# Patient Record
Sex: MALE | Race: WHITE | Employment: FULL TIME | URBAN - METROPOLITAN AREA
[De-identification: names, ages, dates, MRNs, and addresses within clinical notes are randomized per-mention and may not be internally consistent; named-entity substitution may affect disease eponyms.]

---

## 2020-05-04 PROCEDURE — U0004 COV-19 TEST NON-CDC HGH THRU: HCPCS | Performed by: INTERNAL MEDICINE

## 2020-05-04 PROCEDURE — U0002 COVID-19 LAB TEST NON-CDC: HCPCS | Performed by: INTERNAL MEDICINE

## 2022-11-22 ENCOUNTER — HOSPITAL ENCOUNTER (INPATIENT)
Age: 62
LOS: 4 days | Discharge: HOME | DRG: 439 | End: 2022-11-26
Attending: INTERNAL MEDICINE | Admitting: INTERNAL MEDICINE
Payer: COMMERCIAL

## 2022-11-22 PROBLEM — K80.50 CHOLEDOCHOLITHIASIS: Status: ACTIVE | Noted: 2022-11-22

## 2022-11-22 PROBLEM — I10 ESSENTIAL HYPERTENSION: Status: ACTIVE | Noted: 2022-11-22

## 2022-11-22 PROBLEM — R10.13 EPIGASTRIC ABDOMINAL PAIN: Status: ACTIVE | Noted: 2022-11-22

## 2022-11-22 PROCEDURE — 6360000002 HC RX W HCPCS: Performed by: INTERNAL MEDICINE

## 2022-11-22 PROCEDURE — 2580000003 HC RX 258: Performed by: INTERNAL MEDICINE

## 2022-11-22 PROCEDURE — 1200000000 HC SEMI PRIVATE

## 2022-11-22 RX ORDER — ONDANSETRON 2 MG/ML
4 INJECTION INTRAMUSCULAR; INTRAVENOUS EVERY 6 HOURS PRN
Status: DISCONTINUED | OUTPATIENT
Start: 2022-11-22 | End: 2022-11-26 | Stop reason: HOSPADM

## 2022-11-22 RX ORDER — ACETAMINOPHEN 650 MG/1
650 SUPPOSITORY RECTAL EVERY 6 HOURS PRN
Status: DISCONTINUED | OUTPATIENT
Start: 2022-11-22 | End: 2022-11-26 | Stop reason: HOSPADM

## 2022-11-22 RX ORDER — POLYETHYLENE GLYCOL 3350 17 G/17G
17 POWDER, FOR SOLUTION ORAL DAILY PRN
Status: DISCONTINUED | OUTPATIENT
Start: 2022-11-22 | End: 2022-11-26 | Stop reason: HOSPADM

## 2022-11-22 RX ORDER — SODIUM CHLORIDE 0.9 % (FLUSH) 0.9 %
5-40 SYRINGE (ML) INJECTION PRN
Status: DISCONTINUED | OUTPATIENT
Start: 2022-11-22 | End: 2022-11-26 | Stop reason: HOSPADM

## 2022-11-22 RX ORDER — ACETAMINOPHEN 325 MG/1
650 TABLET ORAL EVERY 6 HOURS PRN
Status: DISCONTINUED | OUTPATIENT
Start: 2022-11-22 | End: 2022-11-26 | Stop reason: HOSPADM

## 2022-11-22 RX ORDER — SODIUM CHLORIDE 9 MG/ML
INJECTION, SOLUTION INTRAVENOUS PRN
Status: DISCONTINUED | OUTPATIENT
Start: 2022-11-22 | End: 2022-11-26 | Stop reason: HOSPADM

## 2022-11-22 RX ORDER — ENOXAPARIN SODIUM 100 MG/ML
40 INJECTION SUBCUTANEOUS EVERY 24 HOURS
Status: DISCONTINUED | OUTPATIENT
Start: 2022-11-22 | End: 2022-11-26 | Stop reason: HOSPADM

## 2022-11-22 RX ORDER — OXYCODONE HYDROCHLORIDE AND ACETAMINOPHEN 5; 325 MG/1; MG/1
1 TABLET ORAL EVERY 4 HOURS PRN
Status: DISCONTINUED | OUTPATIENT
Start: 2022-11-22 | End: 2022-11-26 | Stop reason: HOSPADM

## 2022-11-22 RX ORDER — HYDRALAZINE HYDROCHLORIDE 20 MG/ML
10 INJECTION INTRAMUSCULAR; INTRAVENOUS EVERY 4 HOURS PRN
Status: DISCONTINUED | OUTPATIENT
Start: 2022-11-22 | End: 2022-11-26 | Stop reason: HOSPADM

## 2022-11-22 RX ORDER — SODIUM CHLORIDE 0.9 % (FLUSH) 0.9 %
5-40 SYRINGE (ML) INJECTION EVERY 12 HOURS SCHEDULED
Status: DISCONTINUED | OUTPATIENT
Start: 2022-11-22 | End: 2022-11-26 | Stop reason: HOSPADM

## 2022-11-22 RX ORDER — MORPHINE SULFATE 2 MG/ML
2 INJECTION, SOLUTION INTRAMUSCULAR; INTRAVENOUS EVERY 4 HOURS PRN
Status: DISCONTINUED | OUTPATIENT
Start: 2022-11-22 | End: 2022-11-22

## 2022-11-22 RX ORDER — SODIUM CHLORIDE 9 MG/ML
INJECTION, SOLUTION INTRAVENOUS CONTINUOUS
Status: ACTIVE | OUTPATIENT
Start: 2022-11-22 | End: 2022-11-23

## 2022-11-22 RX ORDER — ONDANSETRON 4 MG/1
4 TABLET, ORALLY DISINTEGRATING ORAL EVERY 8 HOURS PRN
Status: DISCONTINUED | OUTPATIENT
Start: 2022-11-22 | End: 2022-11-26 | Stop reason: HOSPADM

## 2022-11-22 RX ORDER — OXYCODONE HYDROCHLORIDE AND ACETAMINOPHEN 5; 325 MG/1; MG/1
2 TABLET ORAL EVERY 4 HOURS PRN
Status: DISCONTINUED | OUTPATIENT
Start: 2022-11-22 | End: 2022-11-26 | Stop reason: HOSPADM

## 2022-11-22 RX ORDER — MORPHINE SULFATE 4 MG/ML
4 INJECTION, SOLUTION INTRAMUSCULAR; INTRAVENOUS EVERY 4 HOURS PRN
Status: DISCONTINUED | OUTPATIENT
Start: 2022-11-22 | End: 2022-11-22

## 2022-11-22 RX ADMIN — SODIUM CHLORIDE, PRESERVATIVE FREE 10 ML: 5 INJECTION INTRAVENOUS at 20:39

## 2022-11-22 RX ADMIN — SODIUM CHLORIDE: 9 INJECTION, SOLUTION INTRAVENOUS at 20:36

## 2022-11-22 RX ADMIN — HYDROMORPHONE HYDROCHLORIDE 1 MG: 1 INJECTION, SOLUTION INTRAMUSCULAR; INTRAVENOUS; SUBCUTANEOUS at 20:36

## 2022-11-22 RX ADMIN — MORPHINE SULFATE 4 MG: 4 INJECTION, SOLUTION INTRAMUSCULAR; INTRAVENOUS at 18:28

## 2022-11-22 SDOH — HEALTH STABILITY: PHYSICAL HEALTH: ON AVERAGE, HOW MANY MINUTES DO YOU ENGAGE IN EXERCISE AT THIS LEVEL?: 50 MIN

## 2022-11-22 SDOH — ECONOMIC STABILITY: INCOME INSECURITY: IN THE LAST 12 MONTHS, WAS THERE A TIME WHEN YOU WERE NOT ABLE TO PAY THE MORTGAGE OR RENT ON TIME?: NO

## 2022-11-22 SDOH — ECONOMIC STABILITY: HOUSING INSECURITY
IN THE LAST 12 MONTHS, WAS THERE A TIME WHEN YOU DID NOT HAVE A STEADY PLACE TO SLEEP OR SLEPT IN A SHELTER (INCLUDING NOW)?: NO

## 2022-11-22 SDOH — ECONOMIC STABILITY: HOUSING INSECURITY: IN THE LAST 12 MONTHS, HOW MANY PLACES HAVE YOU LIVED?: 1

## 2022-11-22 SDOH — HEALTH STABILITY: PHYSICAL HEALTH: ON AVERAGE, HOW MANY DAYS PER WEEK DO YOU ENGAGE IN MODERATE TO STRENUOUS EXERCISE (LIKE A BRISK WALK)?: 5 DAYS

## 2022-11-22 ASSESSMENT — PAIN DESCRIPTION - DESCRIPTORS
DESCRIPTORS: ACHING;DISCOMFORT;STABBING;SPASM
DESCRIPTORS: CRUSHING;DISCOMFORT;GNAWING

## 2022-11-22 ASSESSMENT — PATIENT HEALTH QUESTIONNAIRE - PHQ9
1. LITTLE INTEREST OR PLEASURE IN DOING THINGS: NOT AT ALL
2. FEELING DOWN, DEPRESSED OR HOPELESS: NOT AT ALL
SUM OF ALL RESPONSES TO PHQ9 QUESTIONS 1 & 2: 0

## 2022-11-22 ASSESSMENT — PAIN SCALES - GENERAL
PAINLEVEL_OUTOF10: 4
PAINLEVEL_OUTOF10: 8
PAINLEVEL_OUTOF10: 4
PAINLEVEL_OUTOF10: 8
PAINLEVEL_OUTOF10: 7
PAINLEVEL_OUTOF10: 7

## 2022-11-22 ASSESSMENT — SOCIAL DETERMINANTS OF HEALTH (SDOH)
HOW HARD IS IT FOR YOU TO PAY FOR THE VERY BASICS LIKE FOOD, HOUSING, MEDICAL CARE, AND HEATING?: NOT HARD AT ALL
WITHIN THE LAST YEAR, HAVE YOU BEEN AFRAID OF YOUR PARTNER OR EX-PARTNER?: NO
WITHIN THE LAST YEAR, HAVE TO BEEN RAPED OR FORCED TO HAVE ANY KIND OF SEXUAL ACTIVITY BY YOUR PARTNER OR EX-PARTNER?: NO
WITHIN THE LAST YEAR, HAVE YOU BEEN HUMILIATED OR EMOTIONALLY ABUSED IN OTHER WAYS BY YOUR PARTNER OR EX-PARTNER?: NO
WITHIN THE LAST YEAR, HAVE YOU BEEN KICKED, HIT, SLAPPED, OR OTHERWISE PHYSICALLY HURT BY YOUR PARTNER OR EX-PARTNER?: NO

## 2022-11-22 ASSESSMENT — PAIN - FUNCTIONAL ASSESSMENT
PAIN_FUNCTIONAL_ASSESSMENT: PREVENTS OR INTERFERES SOME ACTIVE ACTIVITIES AND ADLS
PAIN_FUNCTIONAL_ASSESSMENT: PREVENTS OR INTERFERES SOME ACTIVE ACTIVITIES AND ADLS

## 2022-11-22 ASSESSMENT — PAIN DESCRIPTION - ORIENTATION
ORIENTATION: MID
ORIENTATION: MID

## 2022-11-22 ASSESSMENT — PAIN DESCRIPTION - FREQUENCY: FREQUENCY: CONTINUOUS

## 2022-11-22 ASSESSMENT — PAIN DESCRIPTION - LOCATION
LOCATION: ABDOMEN
LOCATION: ABDOMEN

## 2022-11-22 ASSESSMENT — PAIN SCALES - WONG BAKER: WONGBAKER_NUMERICALRESPONSE: 6

## 2022-11-23 ENCOUNTER — APPOINTMENT (OUTPATIENT)
Dept: MRI IMAGING | Age: 62
DRG: 439 | End: 2022-11-23
Attending: INTERNAL MEDICINE
Payer: COMMERCIAL

## 2022-11-23 LAB
ALBUMIN SERPL-MCNC: 3.6 G/DL (ref 3.4–5)
ALP BLD-CCNC: 130 U/L (ref 40–129)
ALT SERPL-CCNC: 218 U/L (ref 10–40)
ANION GAP SERPL CALCULATED.3IONS-SCNC: 10 MMOL/L (ref 3–16)
ANION GAP SERPL CALCULATED.3IONS-SCNC: 9 MMOL/L (ref 3–16)
AST SERPL-CCNC: 59 U/L (ref 15–37)
BILIRUB SERPL-MCNC: 0.6 MG/DL (ref 0–1)
BILIRUBIN DIRECT: <0.2 MG/DL (ref 0–0.3)
BILIRUBIN, INDIRECT: ABNORMAL MG/DL (ref 0–1)
BUN BLDV-MCNC: 22 MG/DL (ref 7–20)
BUN BLDV-MCNC: 22 MG/DL (ref 7–20)
CALCIUM SERPL-MCNC: 8.1 MG/DL (ref 8.3–10.6)
CALCIUM SERPL-MCNC: 8.2 MG/DL (ref 8.3–10.6)
CHLORIDE BLD-SCNC: 103 MMOL/L (ref 99–110)
CHLORIDE BLD-SCNC: 103 MMOL/L (ref 99–110)
CHOLESTEROL, TOTAL: 164 MG/DL (ref 0–199)
CO2: 27 MMOL/L (ref 21–32)
CO2: 28 MMOL/L (ref 21–32)
CREAT SERPL-MCNC: 0.9 MG/DL (ref 0.8–1.3)
CREAT SERPL-MCNC: 0.9 MG/DL (ref 0.8–1.3)
GFR SERPL CREATININE-BSD FRML MDRD: >60 ML/MIN/{1.73_M2}
GFR SERPL CREATININE-BSD FRML MDRD: >60 ML/MIN/{1.73_M2}
GLUCOSE BLD-MCNC: 112 MG/DL (ref 70–99)
GLUCOSE BLD-MCNC: 115 MG/DL (ref 70–99)
HCT VFR BLD CALC: 34.8 % (ref 40.5–52.5)
HDLC SERPL-MCNC: 29 MG/DL (ref 40–60)
HEMOGLOBIN: 11.8 G/DL (ref 13.5–17.5)
LDL CHOLESTEROL CALCULATED: 103 MG/DL
LIPASE: 631 U/L (ref 13–60)
MAGNESIUM: 1.8 MG/DL (ref 1.8–2.4)
MCH RBC QN AUTO: 32 PG (ref 26–34)
MCHC RBC AUTO-ENTMCNC: 33.8 G/DL (ref 31–36)
MCV RBC AUTO: 94.6 FL (ref 80–100)
PDW BLD-RTO: 12.9 % (ref 12.4–15.4)
PHOSPHORUS: 2.2 MG/DL (ref 2.5–4.9)
PLATELET # BLD: 264 K/UL (ref 135–450)
PMV BLD AUTO: 7.5 FL (ref 5–10.5)
POTASSIUM REFLEX MAGNESIUM: 3.7 MMOL/L (ref 3.5–5.1)
POTASSIUM SERPL-SCNC: 3.7 MMOL/L (ref 3.5–5.1)
RBC # BLD: 3.68 M/UL (ref 4.2–5.9)
SODIUM BLD-SCNC: 140 MMOL/L (ref 136–145)
SODIUM BLD-SCNC: 140 MMOL/L (ref 136–145)
TOTAL PROTEIN: 6.1 G/DL (ref 6.4–8.2)
TRIGL SERPL-MCNC: 158 MG/DL (ref 0–150)
VLDLC SERPL CALC-MCNC: 32 MG/DL
WBC # BLD: 14.4 K/UL (ref 4–11)

## 2022-11-23 PROCEDURE — 80076 HEPATIC FUNCTION PANEL: CPT

## 2022-11-23 PROCEDURE — 6360000002 HC RX W HCPCS: Performed by: INTERNAL MEDICINE

## 2022-11-23 PROCEDURE — 83690 ASSAY OF LIPASE: CPT

## 2022-11-23 PROCEDURE — 6370000000 HC RX 637 (ALT 250 FOR IP): Performed by: INTERNAL MEDICINE

## 2022-11-23 PROCEDURE — 36415 COLL VENOUS BLD VENIPUNCTURE: CPT

## 2022-11-23 PROCEDURE — 80069 RENAL FUNCTION PANEL: CPT

## 2022-11-23 PROCEDURE — 83735 ASSAY OF MAGNESIUM: CPT

## 2022-11-23 PROCEDURE — 74181 MRI ABDOMEN W/O CONTRAST: CPT

## 2022-11-23 PROCEDURE — 94761 N-INVAS EAR/PLS OXIMETRY MLT: CPT

## 2022-11-23 PROCEDURE — 2700000000 HC OXYGEN THERAPY PER DAY

## 2022-11-23 PROCEDURE — 80061 LIPID PANEL: CPT

## 2022-11-23 PROCEDURE — 1200000000 HC SEMI PRIVATE

## 2022-11-23 PROCEDURE — 2580000003 HC RX 258: Performed by: INTERNAL MEDICINE

## 2022-11-23 PROCEDURE — 85027 COMPLETE CBC AUTOMATED: CPT

## 2022-11-23 RX ORDER — SODIUM CHLORIDE 9 MG/ML
INJECTION, SOLUTION INTRAVENOUS CONTINUOUS
Status: DISCONTINUED | OUTPATIENT
Start: 2022-11-23 | End: 2022-11-24

## 2022-11-23 RX ORDER — OLMESARTAN MEDOXOMIL 40 MG/1
40 TABLET ORAL DAILY
COMMUNITY

## 2022-11-23 RX ORDER — SODIUM CHLORIDE 9 MG/ML
INJECTION, SOLUTION INTRAVENOUS CONTINUOUS
Status: DISCONTINUED | OUTPATIENT
Start: 2022-11-23 | End: 2022-11-23

## 2022-11-23 RX ORDER — NEBIVOLOL 10 MG/1
TABLET ORAL DAILY
COMMUNITY

## 2022-11-23 RX ADMIN — ENOXAPARIN SODIUM 40 MG: 100 INJECTION SUBCUTANEOUS at 20:35

## 2022-11-23 RX ADMIN — SODIUM CHLORIDE, PRESERVATIVE FREE 10 ML: 5 INJECTION INTRAVENOUS at 20:37

## 2022-11-23 RX ADMIN — OXYCODONE AND ACETAMINOPHEN 1 TABLET: 5; 325 TABLET ORAL at 14:48

## 2022-11-23 RX ADMIN — SODIUM CHLORIDE: 9 INJECTION, SOLUTION INTRAVENOUS at 04:50

## 2022-11-23 RX ADMIN — SODIUM CHLORIDE, PRESERVATIVE FREE 10 ML: 5 INJECTION INTRAVENOUS at 08:34

## 2022-11-23 RX ADMIN — HYDROMORPHONE HYDROCHLORIDE 1 MG: 1 INJECTION, SOLUTION INTRAMUSCULAR; INTRAVENOUS; SUBCUTANEOUS at 08:35

## 2022-11-23 RX ADMIN — OXYCODONE AND ACETAMINOPHEN 1 TABLET: 5; 325 TABLET ORAL at 02:34

## 2022-11-23 RX ADMIN — HYDROMORPHONE HYDROCHLORIDE 1 MG: 1 INJECTION, SOLUTION INTRAMUSCULAR; INTRAVENOUS; SUBCUTANEOUS at 20:36

## 2022-11-23 RX ADMIN — OXYCODONE AND ACETAMINOPHEN 1 TABLET: 5; 325 TABLET ORAL at 10:01

## 2022-11-23 RX ADMIN — HYDROMORPHONE HYDROCHLORIDE 1 MG: 1 INJECTION, SOLUTION INTRAMUSCULAR; INTRAVENOUS; SUBCUTANEOUS at 01:14

## 2022-11-23 RX ADMIN — HYDROMORPHONE HYDROCHLORIDE 1 MG: 1 INJECTION, SOLUTION INTRAMUSCULAR; INTRAVENOUS; SUBCUTANEOUS at 04:51

## 2022-11-23 RX ADMIN — SODIUM CHLORIDE: 9 INJECTION, SOLUTION INTRAVENOUS at 19:00

## 2022-11-23 RX ADMIN — OXYCODONE AND ACETAMINOPHEN 1 TABLET: 5; 325 TABLET ORAL at 06:17

## 2022-11-23 RX ADMIN — HYDROMORPHONE HYDROCHLORIDE 1 MG: 1 INJECTION, SOLUTION INTRAMUSCULAR; INTRAVENOUS; SUBCUTANEOUS at 15:49

## 2022-11-23 RX ADMIN — OXYCODONE AND ACETAMINOPHEN 2 TABLET: 5; 325 TABLET ORAL at 18:59

## 2022-11-23 ASSESSMENT — PAIN SCALES - GENERAL
PAINLEVEL_OUTOF10: 8
PAINLEVEL_OUTOF10: 0
PAINLEVEL_OUTOF10: 8
PAINLEVEL_OUTOF10: 7
PAINLEVEL_OUTOF10: 5
PAINLEVEL_OUTOF10: 6
PAINLEVEL_OUTOF10: 9
PAINLEVEL_OUTOF10: 7
PAINLEVEL_OUTOF10: 6
PAINLEVEL_OUTOF10: 8

## 2022-11-23 ASSESSMENT — PAIN DESCRIPTION - DESCRIPTORS
DESCRIPTORS: ACHING;DISCOMFORT;DULL
DESCRIPTORS: DISCOMFORT
DESCRIPTORS: BURNING;DISCOMFORT;CRAMPING
DESCRIPTORS: ACHING

## 2022-11-23 ASSESSMENT — PAIN SCALES - WONG BAKER
WONGBAKER_NUMERICALRESPONSE: 2
WONGBAKER_NUMERICALRESPONSE: 0

## 2022-11-23 ASSESSMENT — PAIN - FUNCTIONAL ASSESSMENT
PAIN_FUNCTIONAL_ASSESSMENT: PREVENTS OR INTERFERES SOME ACTIVE ACTIVITIES AND ADLS
PAIN_FUNCTIONAL_ASSESSMENT: ACTIVITIES ARE NOT PREVENTED
PAIN_FUNCTIONAL_ASSESSMENT: PREVENTS OR INTERFERES SOME ACTIVE ACTIVITIES AND ADLS

## 2022-11-23 ASSESSMENT — PAIN DESCRIPTION - ORIENTATION
ORIENTATION: MID
ORIENTATION: RIGHT;LEFT
ORIENTATION: RIGHT;LEFT;MID

## 2022-11-23 ASSESSMENT — PAIN DESCRIPTION - FREQUENCY: FREQUENCY: CONTINUOUS

## 2022-11-23 ASSESSMENT — PAIN DESCRIPTION - LOCATION
LOCATION: ABDOMEN

## 2022-11-23 ASSESSMENT — PAIN DESCRIPTION - PAIN TYPE
TYPE: ACUTE PAIN

## 2022-11-23 NOTE — CARE COORDINATION
CASE MANAGEMENT INITIAL ASSESSMENT    Reviewed chart and completed assessment with patient family at bedside. Pt asleep during interview  Family present:  yes, spouse and 2 other family member    Explained Case Management role/services. Primary contact information: spouse    Health Care Decision Maker :   Primary Decision Maker: Jaelyn Daley - Spouse - [phone]    Supplemental (Other) Decision Maker: Jane Lozano - Other Relative - [phone]        Admit date/status: IPA 11/22/22  Diagnosis:choledocholithiasis     Is this a Readmission?:  No      Insurance:none listed, but spouse states pt has coverage     Precert required for SNF: No       3 night stay required: No    Living arrangements, Adls, care needs, prior to admission: IPTA, resides w/spouse. Active     Durable Medical Equipment at home:  none    Services in the home and/or outpatient, prior to admission: none    Current PCP:         Medications: Prescription coverage? Yes Will pt require financial assistance with medications No     Transportation needs: spouse states family can provide     PT/OT recs: [facility] Exemption Notification (HEN): na    Barriers to discharge: none    Plan/comments: spouse states pt will likely dc home without needs. Please consult CM team if needs arise.      Haleigh Pickard RN

## 2022-11-23 NOTE — CONSULTS
Killian George is a 58 y.o. male asked to see us in consultation by  No primary care provider on file. Marquis Thurston MD for evaluation of pancreatitis, concern for CBD stones. Ms. Harmony Miller is a 60-year-old male with past medical history of HTN who presented to the ER with acute onset of diffuse abdominal pain radiating to his right shoulder that began yesterday morning. States he returned from a mission trip in [place] 3 weeks ago and developed traveler's diarrhea shortly after his return. He completed a week course of ciprofloxacin with resolution of symptoms. When he woke at 3 am yesterday with pain, he had a syncopal episode. Wife says he passed out. He presented to [facility]  ER and was found to have an elevated bilirubin 1.7, , , alk phos 170 ad lipase 97924. White count was elevated at 16,000. CT findings were consistent with pancreatitis with intrahepatic ductal dilation. He was transferred to [facility] for further care. Repeat LFTs this morning are improved, total bilirubin down to 0.6 and alk phos 130. He went for an MRCP that showed no evidence of choledocholithiasis or biliary dilation. Gallstones are noted within the gallbladder. He drinks alcohol on special occasions and holidays. TCGs in process. Aside from Cipro no other medications or supplements. Currently on 4 L O2 as he became hypoxic. This has improved with pain control. Medications Prior to Admission: olmesartan (BENICAR) 40 MG tablet, Take 40 mg by mouth daily  nebivolol (BYSTOLIC) 10 MG tablet, Take by mouth daily    Medication:   sodium chloride flush  5-40 mL IntraVENous 2 times per day    enoxaparin  40 mg SubCUTAneous Q24H      sodium chloride      sodium chloride 125 mL/hr at 11/23/22 8180       Allergies:  No Known Allergies    Immunizations:     There is no immunization history on file for this patient. Family history, past medical history, and  social  history  are  reviewed as  below. Past Medical  History:  Past Medical History:   Diagnosis Date    Hypertension        Past  Surgical History:  Past Surgical History:   Procedure Laterality Date    SHOULDER SURGERY Left        Family  History:  History reviewed. No pertinent family history. Social History:  Social History     Tobacco Use    Smoking status: Never    Smokeless tobacco: Never   Substance Use Topics    Alcohol use: Not Currently    Drug use: Not Currently       ROS  Constitutional:  Denies fever,sweats, chills or weight loss  Eyes:  Denies change in visual acuity   HENT:  Denies hearing loss or dizziness  Respiratory:  Denies cough or shortness of breath   Cardiovascular:  Denies edema or chest pain  :  Denies dysuria, hematuria, urgency or frequency  Musculoskeletal:  +  back pain or joint pain   Integument:  Denies rash   Neurologic:  Denies headache, previous stroke, TIA, confusion   Endocrine:  Denies polyuria or polydipsia   Lymphatic:  Denies swollen glands   Psychiatric:  Denies depression or anxiety   Hematologic: Denies previous anemia or easy bruising    All other review of systems negative, except for those noted. PHYSICAL EXAM:   VITAL SIGNS: /76   Pulse 94   Temp 99.7 °F (37.6 °C) (Oral)   Resp 18   Ht 5' 8\" (1.727 m)   Wt 182 lb (82.6 kg)   SpO2 90%   BMI 27.67 kg/m²   Date 11/23/22 0000 - 11/23/22 2359   Shift 4281-8196 5430-4417 4996-6771 24 Hour Total   INTAKE   P.O. 0   0   I. V.(mL/kg/hr) 1004(1.5)   1004   Shift Total(mL/kg) 0364(85.8)   4676(28.2)   OUTPUT   Shift Total(mL/kg)       Weight (kg) 82.6 82.6 82.6 82.6       Constitutional: Well developed. Well nourished. Non-toxic appearance, appears in pain. HENT: Normocephalic. Atraumatic. Bilateral external ears normal, Oropharynx moist.  No oral exudate.   Nose normal.   Eyes: No  Scleral icterus   Cardiovascular: RRR  Thorax & Lungs: Non labored at rest, no respiratory distress, 4 LO2  Abdomen: soft, + mid abdominal, epigastric and RUQ tenderness. No guarding, rebound tenderness. No hepatomegaly. No splenomegaly. No ascites  Rectal:  Deferred. Skin: Warm, dry. No erythema. No rash. Extremities: Intact distal pulses, No deformity. No edema. Neurologic:  No Asterixis    RESULTS    No results found for: ALT, AST, GGT, ALKPHOS, BILIDIR, PROT, LABALBU, INR, AMYLASE, LIPASE  No results found for: WBC, HGB, HCT, MCV, PLT  No results found for: CREATININE, BUN, NA, K, CL, CO2    IMAGES  No results found. Assessment:  1. Acute pancreatitis, suspect biliary cause. Appears to have passed stones as his labs are improved and MRCP without evidence of choledocholithiasis this morning. No frequent alcohol use. TCGs have also been ordered. 2. Elevated LFTs secondary to biliary obstruction. Unlikely DILI from Cipro. Plan:  1. NPO.  2. Continue IVF at 150. Received aggressive hydration in [place] - boluses and fluids at 250 for 24 hours. 3. Antiemetics and analgesics as needed. 4. Follow LFTs. 5. Will need eventual surgery consult once pancreatitis improved. 1.  The patient indicates understanding of these issues and agrees with the plan. 2.  I reviewed the patient's medical information and medical history. 3.  I have reviewed the past medical, family, and social history sections including the medications and allergies listed in the above medical record. Thank you for permitting us to participate in the care of your patient. Please do not hesitate to call with questions or concerns.        Electronically signed by: NELLA Graham 11/23/2022 8:04 AM

## 2022-11-23 NOTE — PROGRESS NOTES
Hospitalist Progress Note      PCP: No primary care provider on file. Date of Admission: 11/22/2022    Chief Complaint: epigastric abdominal pain, passing out    Hospital Course: reviewed     Subjective: resting in bed, no complaints, family at bedside, pain controlled       Medications:  Reviewed    Infusion Medications    sodium chloride      sodium chloride 125 mL/hr at 11/23/22 0450     Scheduled Medications    sodium chloride flush  5-40 mL IntraVENous 2 times per day    enoxaparin  40 mg SubCUTAneous Q24H     PRN Meds: sodium chloride flush, sodium chloride, ondansetron **OR** ondansetron, polyethylene glycol, acetaminophen **OR** acetaminophen, oxyCODONE-acetaminophen **OR** oxyCODONE-acetaminophen, hydrALAZINE, HYDROmorphone      Intake/Output Summary (Last 24 hours) at 11/23/2022 0924  Last data filed at 11/23/2022 0555  Gross per 24 hour   Intake 1004 ml   Output --   Net 1004 ml       Physical Exam Performed:    /78   Pulse 81   Temp 99.7 °F (37.6 °C) (Oral)   Resp 16   Ht 5' 8\" (1.727 m)   Wt 182 lb (82.6 kg)   SpO2 93%   BMI 27.67 kg/m²     General appearance: No apparent distress, appears stated age and cooperative. HEENT: Pupils equal, round, and reactive to light. Conjunctivae/corneas clear. Neck: Supple, with full range of motion. No jugular venous distention. Trachea midline. Respiratory:  Normal respiratory effort. Clear to auscultation, bilaterally without Rales/Wheezes/Rhonchi. Cardiovascular: Regular rate and rhythm with normal S1/S2 without murmurs, rubs or gallops. Abdomen: Soft, non-tender, non-distended with normal bowel sounds. Musculoskeletal: No clubbing, cyanosis or edema bilaterally. Full range of motion without deformity. Skin: Skin color, texture, turgor normal.  No rashes or lesions. Neurologic:  Neurovascularly intact without any focal sensory/motor deficits.  Cranial nerves: II-XII intact, grossly non-focal.  Psychiatric: Alert and oriented, thought content appropriate, normal insight  Capillary Refill: Brisk, 3 seconds, normal   Peripheral Pulses: +2 palpable, equal bilaterally       Labs:   No results for input(s): WBC, HGB, HCT, PLT in the last 72 hours. No results for input(s): NA, K, CL, CO2, BUN, CREATININE, CALCIUM, PHOS in the last 72 hours. Invalid input(s): MAGNES  No results for input(s): AST, ALT, BILIDIR, BILITOT, ALKPHOS in the last 72 hours. No results for input(s): INR in the last 72 hours. No results for input(s): Othelia Nutley in the last 72 hours.     Urinalysis:    No results found for: Larna Jesica, BACTERIA, RBCUA, BLOODU, [address], [address]    Radiology:  MRI ABDOMEN WO CONTRAST MRCP    (Results Pending)       IP CONSULT TO GI    Assessment/Plan:    Active Hospital Problems    Diagnosis     Choledocholithiasis [K80.50]      Priority: Medium    Essential hypertension [I10]      Priority: Medium    Epigastric abdominal pain [R10.13]      Priority: Medium       Choledocholithiasis, gallstone pancreatitis - Kept NPO.  - Provided aggressive IV fluids  -consulted GI.   -MRCP ordered and results pending  - Check Lipids     Epigastric abdominal pain - Provided pain medications as needed     Essential (primary) hypertension - provided prn meds and monitor blood pressure           Code Status: Full Code  Diet: Diet NPO Exceptions are: Sips of Water with Meds  Code Status: Full Code  PT/OT Eval Status: not needed    Dispo - pending GI recs, clinical improvement, ?by weekend    Appropriate for A1 Discharge Unit: No      Roberto Frausto MD

## 2022-11-23 NOTE — H&P
Hospital Medicine  History and Physical    PCP: No primary care provider on file. Patient Name: Rashid Peterson    Date of Service: Pt seen/examined on 11/22/22 and admitted to Inpatient with expected LOS greater than two midnights due to medical therapy. CHIEF COMPLAINT:  Pt c/o epigastric abdominal pain, passing out  HISTORY OF PRESENT ILLNESS: Pt is an 58y.o. year-old male with a history of hypertension. He recently returned from a [address] work in [place], [place] (3-weeks ago). When he returned he had a \"stomach bug.\" He was treated wit Cipro and improved. He developed mild to moderate pain in his epigastric area last night. He proceeded to go to bed. He awoke at approximately 3 am and went to the bathroom. He then experienced severe epigastric abdominal pain and passed out. His wife heard him fall and found him on the bathroom floor I severe pain. He describes the pain as severe, gripping and spasming pain. In the ER his work up had findings concerning for Gallstone Pancreatitis with a Total Bili of 1.7, , , Alk Phos 170, WBC pf 16.0 and a Lipase of 44557. A CT of the abdomen and pelvis had finding c/w acute pancreatitis, borderline dilated common bile duct without significant intrahepatic ductal dilatation. No defined ductal stone, pancreatic mass or ampullary mass. Hepatic steatosis. He is being admitted for further evaluation and treatment. Associated signs and symptoms do not include fever or chills, diarrhea, melena, hematochezia, hematemesis, sweats, dark urine or jaundice. Past Medical History:    History reviewed. No pertinent past medical history. Past Surgical History:    No past surgical history on file. Allergies:  Patient has no known allergies. Medications Prior to Admission:    Prior to Admission medications    Not on File       Family History:   Family history is negative for accelerated coronary artery disease, diabetes or malignancies.     Social History:   TOBACCO:   reports that he has never smoked. He has never used smokeless tobacco.  ETOH:   reports that he does not currently use alcohol. OCCUPATION:      REVIEW OF SYSTEMS:  A full review of systems was performed and is negative except for that which appears in the HPI    Physical Exam:    Vitals: Pulse 64   Temp 98 °F (36.7 °C) (Oral)   Resp 20   Ht 5' 8\" (1.727 m)   Wt 182 lb (82.6 kg)   SpO2 92%   BMI 27.67 kg/m²   General appearance: WD/WN 58y.o. year-old male who is alert, appears stated age and is cooperative  HEENT: Head: Normocephalic, no lesions, without obvious abnormality. Eye: Normal external eye, conjunctiva, lids cornea, PEERL. Ears: Normal external ears. Non-tender. Nose: Normal external nose, mucus membranes and septum. Pharynx: Dental Hygiene adequate. Normal buccal mucosa. Normal pharynx. Neck: no adenopathy, no carotid bruit, no JVD, supple, symmetrical, trachea midline and thyroid not enlarged, symmetric, no tenderness/mass/nodules  Lungs: clear to auscultation bilaterally and no use of accessory muscles  Heart: regular rate and rhythm, S1, S2 normal, no murmur, click, rub or gallop and normal apical impulse  Abdomen: Generalized TTP wit guarding , no rebound; bowel sounds normal; no masses, no organomegaly  Extremities: extremities atraumatic, no cyanosis or edema and Homans sign is negative, no sign of DVT. Capillary Refill: Acceptable < 3 seconds   Peripheral Pulses: +3 easily felt, not easily obliterated with pressures   Skin: Skin color, texture, turgor normal. No rashes or lesions on exposed skin  Neurologic: Neurovascularly intact without any focal sensory/motor deficits. Cranial nerves: II-XII intact, grossly non-focal. Gait was not tested. Mental Status: Alert and oriented, thought content appropriate, normal insight        CBC: No results for input(s): WBC, HGB, PLT in the last 72 hours.   BMP:  No results for input(s): NA, K, CL, CO2, BUN, CREATININE, GLUCOSE in the last 72 hours. Troponin: No results for input(s): TROPONINI in the last 72 hours. PT/INR:  No results found for: PTINR  U/A:  No results found for: LEUKOCYTESUR, NITRITE, RBCUA, SPECGRAV, [address], BILIRUBINUR, BLOODU, GLUCOSEU, [address]      RAD:   I have independently reviewed and interpreted the imaging studies below and based my recommendations to the patient on those findings. No results found. Assessment:   Principal Problem:    Choledocholithiasis  Active Problems:    Essential hypertension    Epigastric abdominal pain  Resolved Problems:    * No resolved hospital problems. *      Plan:       Choledocholithiasis, gallstone pancreatitis - Keep NPO. Provide IV fluids and consult GI. MRCP ordered. Check Lipids    Epigastric abdominal pain - Provide pain medications as needed    Essential (primary) hypertension - provide prn meds and monitor blood pressure         Code Status: Full Code  Diet: Diet NPO Exceptions are: Sips of Water with Meds  DVT Prophylaxis: Lovenox    (Advanced care planning has been discussed with patient and/or responsible family member and is reflected in the code status.  Further orders associated with this have been entered if appropriate)    Disposition: Anticipate that patient will remain in the hospital for 2 or more midnights depending on further evaluation and clinical course     Please note that over 50 minutes was spent in evaluating the patient, review of records and results, discussion with staff/family, etc.      Yan Carlisle MD

## 2022-11-24 LAB
ALBUMIN SERPL-MCNC: 3.2 G/DL (ref 3.4–5)
ALP BLD-CCNC: 114 U/L (ref 40–129)
ALT SERPL-CCNC: 117 U/L (ref 10–40)
ANION GAP SERPL CALCULATED.3IONS-SCNC: 10 MMOL/L (ref 3–16)
AST SERPL-CCNC: 29 U/L (ref 15–37)
BILIRUB SERPL-MCNC: 0.8 MG/DL (ref 0–1)
BILIRUBIN DIRECT: 0.3 MG/DL (ref 0–0.3)
BILIRUBIN, INDIRECT: 0.5 MG/DL (ref 0–1)
BUN BLDV-MCNC: 20 MG/DL (ref 7–20)
CALCIUM SERPL-MCNC: 8.2 MG/DL (ref 8.3–10.6)
CHLORIDE BLD-SCNC: 102 MMOL/L (ref 99–110)
CO2: 25 MMOL/L (ref 21–32)
CREAT SERPL-MCNC: 0.9 MG/DL (ref 0.8–1.3)
GFR SERPL CREATININE-BSD FRML MDRD: >60 ML/MIN/{1.73_M2}
GLUCOSE BLD-MCNC: 76 MG/DL (ref 70–99)
HCT VFR BLD CALC: 30.8 % (ref 40.5–52.5)
HEMOGLOBIN: 10.6 G/DL (ref 13.5–17.5)
LIPASE: 176 U/L (ref 13–60)
MCH RBC QN AUTO: 32.4 PG (ref 26–34)
MCHC RBC AUTO-ENTMCNC: 34.3 G/DL (ref 31–36)
MCV RBC AUTO: 94.5 FL (ref 80–100)
PDW BLD-RTO: 12.8 % (ref 12.4–15.4)
PLATELET # BLD: 208 K/UL (ref 135–450)
PMV BLD AUTO: 7.6 FL (ref 5–10.5)
POTASSIUM SERPL-SCNC: 3.5 MMOL/L (ref 3.5–5.1)
RBC # BLD: 3.26 M/UL (ref 4.2–5.9)
SODIUM BLD-SCNC: 137 MMOL/L (ref 136–145)
TOTAL PROTEIN: 6 G/DL (ref 6.4–8.2)
WBC # BLD: 16.2 K/UL (ref 4–11)

## 2022-11-24 PROCEDURE — 6370000000 HC RX 637 (ALT 250 FOR IP): Performed by: INTERNAL MEDICINE

## 2022-11-24 PROCEDURE — 6360000002 HC RX W HCPCS: Performed by: INTERNAL MEDICINE

## 2022-11-24 PROCEDURE — 2700000000 HC OXYGEN THERAPY PER DAY

## 2022-11-24 PROCEDURE — 83690 ASSAY OF LIPASE: CPT

## 2022-11-24 PROCEDURE — 94761 N-INVAS EAR/PLS OXIMETRY MLT: CPT

## 2022-11-24 PROCEDURE — 85027 COMPLETE CBC AUTOMATED: CPT

## 2022-11-24 PROCEDURE — 2580000003 HC RX 258: Performed by: INTERNAL MEDICINE

## 2022-11-24 PROCEDURE — 80048 BASIC METABOLIC PNL TOTAL CA: CPT

## 2022-11-24 PROCEDURE — 80076 HEPATIC FUNCTION PANEL: CPT

## 2022-11-24 PROCEDURE — 36415 COLL VENOUS BLD VENIPUNCTURE: CPT

## 2022-11-24 PROCEDURE — 1200000000 HC SEMI PRIVATE

## 2022-11-24 RX ORDER — IBUPROFEN 400 MG/1
400 TABLET ORAL EVERY 12 HOURS PRN
Status: DISCONTINUED | OUTPATIENT
Start: 2022-11-24 | End: 2022-11-25

## 2022-11-24 RX ADMIN — SODIUM CHLORIDE: 9 INJECTION, SOLUTION INTRAVENOUS at 05:01

## 2022-11-24 RX ADMIN — HYDROMORPHONE HYDROCHLORIDE 1 MG: 1 INJECTION, SOLUTION INTRAMUSCULAR; INTRAVENOUS; SUBCUTANEOUS at 11:30

## 2022-11-24 RX ADMIN — HYDROMORPHONE HYDROCHLORIDE 1 MG: 1 INJECTION, SOLUTION INTRAMUSCULAR; INTRAVENOUS; SUBCUTANEOUS at 21:11

## 2022-11-24 RX ADMIN — OXYCODONE AND ACETAMINOPHEN 1 TABLET: 5; 325 TABLET ORAL at 00:32

## 2022-11-24 RX ADMIN — HYDROMORPHONE HYDROCHLORIDE 1 MG: 1 INJECTION, SOLUTION INTRAMUSCULAR; INTRAVENOUS; SUBCUTANEOUS at 01:29

## 2022-11-24 RX ADMIN — ENOXAPARIN SODIUM 40 MG: 100 INJECTION SUBCUTANEOUS at 21:11

## 2022-11-24 RX ADMIN — SODIUM CHLORIDE, PRESERVATIVE FREE 10 ML: 5 INJECTION INTRAVENOUS at 21:11

## 2022-11-24 RX ADMIN — OXYCODONE AND ACETAMINOPHEN 2 TABLET: 5; 325 TABLET ORAL at 16:13

## 2022-11-24 RX ADMIN — HYDROMORPHONE HYDROCHLORIDE 1 MG: 1 INJECTION, SOLUTION INTRAMUSCULAR; INTRAVENOUS; SUBCUTANEOUS at 07:01

## 2022-11-24 RX ADMIN — OXYCODONE AND ACETAMINOPHEN 2 TABLET: 5; 325 TABLET ORAL at 09:24

## 2022-11-24 RX ADMIN — OXYCODONE AND ACETAMINOPHEN 2 TABLET: 5; 325 TABLET ORAL at 05:01

## 2022-11-24 RX ADMIN — IBUPROFEN 400 MG: 400 TABLET, FILM COATED ORAL at 21:11

## 2022-11-24 RX ADMIN — POLYETHYLENE GLYCOL 3350 17 G: 17 POWDER, FOR SOLUTION ORAL at 13:54

## 2022-11-24 ASSESSMENT — PAIN DESCRIPTION - DESCRIPTORS
DESCRIPTORS: ACHING

## 2022-11-24 ASSESSMENT — PAIN SCALES - WONG BAKER
WONGBAKER_NUMERICALRESPONSE: 0
WONGBAKER_NUMERICALRESPONSE: 2

## 2022-11-24 ASSESSMENT — PAIN DESCRIPTION - ORIENTATION
ORIENTATION: RIGHT;LEFT
ORIENTATION: RIGHT;LEFT
ORIENTATION: RIGHT;MID;LEFT
ORIENTATION: RIGHT;LEFT
ORIENTATION: RIGHT;LEFT

## 2022-11-24 ASSESSMENT — PAIN SCALES - GENERAL
PAINLEVEL_OUTOF10: 7
PAINLEVEL_OUTOF10: 6
PAINLEVEL_OUTOF10: 0
PAINLEVEL_OUTOF10: 8
PAINLEVEL_OUTOF10: 0
PAINLEVEL_OUTOF10: 8
PAINLEVEL_OUTOF10: 8
PAINLEVEL_OUTOF10: 7
PAINLEVEL_OUTOF10: 0
PAINLEVEL_OUTOF10: 4
PAINLEVEL_OUTOF10: 7
PAINLEVEL_OUTOF10: 9

## 2022-11-24 ASSESSMENT — PAIN DESCRIPTION - LOCATION
LOCATION: ABDOMEN

## 2022-11-24 ASSESSMENT — PAIN - FUNCTIONAL ASSESSMENT
PAIN_FUNCTIONAL_ASSESSMENT: PREVENTS OR INTERFERES SOME ACTIVE ACTIVITIES AND ADLS
PAIN_FUNCTIONAL_ASSESSMENT: ACTIVITIES ARE NOT PREVENTED
PAIN_FUNCTIONAL_ASSESSMENT: ACTIVITIES ARE NOT PREVENTED
PAIN_FUNCTIONAL_ASSESSMENT: PREVENTS OR INTERFERES SOME ACTIVE ACTIVITIES AND ADLS

## 2022-11-24 NOTE — PROGRESS NOTES
PROGRESS NOTE    HPI: Luz Love is a(n)62 y.o. male admitted for work-up and treatment for Choledocholithiasis [K80.50]. We are following for pancreatitis. Subjective:     Says pancreatitis like pain is improved. Has low back pain. Feels \"puffy\". Objective:     I/O last 3 completed shifts: In: 1104 [P.O.:100; I.V.:1004]  Out: -       /72   Pulse 80   Temp 99 °F (37.2 °C) (Oral)   Resp 18   Ht 5' 8\" (1.727 m)   Wt 182 lb (82.6 kg)   SpO2 93%   BMI 27.67 kg/m²     Physical Exam:  HEENT: anicteric sclera, oropharyngeal membranes pink and moist.  Cor: RRR  Lungs: non-labored, no respiratory distress  Abdomen: distended, mild tenderness, non tympanic. Extremities: trace - +1 BLE, BUE edema  Neuro: alert and oriented x 3      Results:   Lab Results   Component Value Date     (H) 11/24/2022    AST 29 11/24/2022    ALKPHOS 114 11/24/2022    BILIDIR 0.3 11/24/2022    PROT 6.0 (L) 11/24/2022    LABALBU 3.2 (L) 11/24/2022    LIPASE 631.0 (H) 11/23/2022     Lab Results   Component Value Date    WBC 16.2 (H) 11/24/2022    HGB 10.6 (L) 11/24/2022    HCT 30.8 (L) 11/24/2022    MCV 94.5 11/24/2022     11/24/2022     BUN/Cr/glu/ALT/AST/amyl/lip:  --/--/--/117/29/--/-- (11/24 0617)  MRI ABDOMEN WO CONTRAST MRCP    Result Date: 11/23/2022  Acute pancreatitis. Cholelithiasis without evidence for acute cholecystitis. No biliary ductal dilatation or choledocholithiasis. RECOMMENDATIONS: Nonemergent contrast-enhanced liver CT or MRI to further evaluate the segment 3 liver lesion. Impression:  1. Acute pancreatitis, suspect biliary cause with passed CBD stone as LFTs improved, MRCP not suggestive of biliary obstruction. No frequent alcohol use or new medications aside from Cipro 2 weeks ago. TCGs 158.     2. Elevated LFTs secondary to biliary obstruction. Unlikely DILI from Cipro. 3. Fluid overload. 4. Leukocytosis. Likely secondary to inflammation. Plan:  IVF turned off this morning. Recommend continuing low rate if symptom return/ unable to tolerate clears. Can start clear liquid diet later today if remains pain free and narcotics weaned. Antiemetics ordered. Monitor BMP, CBC. Monitor UOP. Please do not hesitate to call with questions or concerns.       Electronically signed by: NELLA Gibson 11/24/2022 8:11 AM

## 2022-11-24 NOTE — PROGRESS NOTES
Hospitalist Progress Note      PCP: No primary care provider on file. Date of Admission: 11/22/2022    Chief Complaint: epigastric abdominal pain, passing out     Hospital Course: reviewed     Subjective: ambulating in room, feels bloated, no appetite       Medications:  Reviewed    Infusion Medications    sodium chloride       Scheduled Medications    sodium chloride flush  5-40 mL IntraVENous 2 times per day    enoxaparin  40 mg SubCUTAneous Q24H     PRN Meds: ibuprofen, sodium chloride flush, sodium chloride, ondansetron **OR** ondansetron, polyethylene glycol, acetaminophen **OR** acetaminophen, oxyCODONE-acetaminophen **OR** oxyCODONE-acetaminophen, hydrALAZINE, HYDROmorphone    No intake or output data in the 24 hours ending 11/24/22 2016    Physical Exam Performed:    BP (!) 145/2   Pulse 64   Temp 98.6 °F (37 °C) (Oral)   Resp 18   Ht 5' 8\" (1.727 m)   Wt 182 lb (82.6 kg)   SpO2 96%   BMI 27.67 kg/m²     General appearance: No apparent distress, appears stated age and cooperative. HEENT: Pupils equal, round, and reactive to light. Conjunctivae/corneas clear. Neck: Supple, with full range of motion. No jugular venous distention. Trachea midline. Respiratory:  Normal respiratory effort. Clear to auscultation, bilaterally without Rales/Wheezes/Rhonchi. Cardiovascular: Regular rate and rhythm with normal S1/S2 without murmurs, rubs or gallops. Abdomen: Soft, non-tender, non-distended with normal bowel sounds. Musculoskeletal: No clubbing, cyanosis or edema bilaterally. Full range of motion without deformity. Skin: Skin color, texture, turgor normal.  No rashes or lesions. Neurologic:  Neurovascularly intact without any focal sensory/motor deficits.  Cranial nerves: II-XII intact, grossly non-focal.  Psychiatric: Alert and oriented, thought content appropriate, normal insight  Capillary Refill: Brisk, 3 seconds, normal   Peripheral Pulses: +2 palpable, equal bilaterally       Labs: Recent Labs     11/23/22  0856 11/24/22  0617   WBC 14.4* 16.2*   HGB 11.8* 10.6*   HCT 34.8* 30.8*    208     Recent Labs     11/23/22  0856 11/23/22  0857 11/24/22  0617    140 137   K 3.7 3.7 3.5    103 102   CO2 28 27 25   BUN 22* 22* 20   CREATININE 0.9 0.9 0.9   CALCIUM 8.2* 8.1* 8.2*   PHOS 2.2*  --   --      Recent Labs     11/23/22  0856 11/24/22  0617   AST 59* 29   * 117*   BILIDIR <0.2 0.3   BILITOT 0.6 0.8   ALKPHOS 130* 114     No results for input(s): INR in the last 72 hours. No results for input(s): [place] in the last 72 hours. Urinalysis:    No results found for: Sofie Mazin, BACTERIA, RBCUA, BLOODU, [address], [address]    Radiology:  MRI ABDOMEN WO CONTRAST MRCP   Final Result   Acute pancreatitis. Cholelithiasis without evidence for acute cholecystitis. No biliary ductal dilatation or choledocholithiasis. RECOMMENDATIONS:   Nonemergent contrast-enhanced liver CT or MRI to further evaluate the segment   3 liver lesion. IP CONSULT TO GI    Assessment/Plan:    Active Hospital Problems    Diagnosis     Choledocholithiasis [K80.50]      Priority: Medium    Essential hypertension [I10]      Priority: Medium    Epigastric abdominal pain [R10.13]      Priority: Medium     Choledocholithiasis, gallstone pancreatitis - Kept NPO.  - Provided aggressive IV fluids  -consulted GI.   -MRCP done 11/23(acute pancreatitis, noted cholelithiasis without evidence for acute cholecystitis , no biliary ductal dilation or choledocolithiasis)  - Checked lipase     Epigastric abdominal pain - Provided pain medications as needed     Essential (primary) hypertension - provided prn meds and monitored blood pressure           Code Status: Full Code  Diet: ADULT DIET;  Clear Liquid  Code Status: Full Code    PT/OT Eval Status: not needed     Dispo - pending GI recs, stop ivfs, pending clinical improvement, ?by weekend    Appropriate for A1 Discharge Unit: Steff Robles MD

## 2022-11-24 NOTE — PROGRESS NOTES
Patient requesting to stop fluids he states his hand and eyes are getting puffy. He also is requesting advil or a muscle relaxant for pain as well.  Attending notified

## 2022-11-25 LAB
ALBUMIN SERPL-MCNC: 3.1 G/DL (ref 3.4–5)
ANION GAP SERPL CALCULATED.3IONS-SCNC: 11 MMOL/L (ref 3–16)
BASOPHILS ABSOLUTE: 0.1 K/UL (ref 0–0.2)
BASOPHILS RELATIVE PERCENT: 0.4 %
BUN BLDV-MCNC: 21 MG/DL (ref 7–20)
CALCIUM SERPL-MCNC: 8.4 MG/DL (ref 8.3–10.6)
CHLORIDE BLD-SCNC: 102 MMOL/L (ref 99–110)
CO2: 25 MMOL/L (ref 21–32)
CREAT SERPL-MCNC: 0.9 MG/DL (ref 0.8–1.3)
EOSINOPHILS ABSOLUTE: 0 K/UL (ref 0–0.6)
EOSINOPHILS RELATIVE PERCENT: 0.3 %
GFR SERPL CREATININE-BSD FRML MDRD: >60 ML/MIN/{1.73_M2}
GLUCOSE BLD-MCNC: 110 MG/DL (ref 70–99)
HCT VFR BLD CALC: 28.5 % (ref 40.5–52.5)
HEMOGLOBIN: 9.9 G/DL (ref 13.5–17.5)
LYMPHOCYTES ABSOLUTE: 0.9 K/UL (ref 1–5.1)
LYMPHOCYTES RELATIVE PERCENT: 6.5 %
MAGNESIUM: 2 MG/DL (ref 1.8–2.4)
MCH RBC QN AUTO: 32.2 PG (ref 26–34)
MCHC RBC AUTO-ENTMCNC: 34.8 G/DL (ref 31–36)
MCV RBC AUTO: 92.6 FL (ref 80–100)
MONOCYTES ABSOLUTE: 1 K/UL (ref 0–1.3)
MONOCYTES RELATIVE PERCENT: 7.3 %
NEUTROPHILS ABSOLUTE: 12.2 K/UL (ref 1.7–7.7)
NEUTROPHILS RELATIVE PERCENT: 85.5 %
PDW BLD-RTO: 12.5 % (ref 12.4–15.4)
PHOSPHORUS: 1.2 MG/DL (ref 2.5–4.9)
PLATELET # BLD: 232 K/UL (ref 135–450)
PMV BLD AUTO: 7.5 FL (ref 5–10.5)
POTASSIUM SERPL-SCNC: 3.2 MMOL/L (ref 3.5–5.1)
RBC # BLD: 3.07 M/UL (ref 4.2–5.9)
SODIUM BLD-SCNC: 138 MMOL/L (ref 136–145)
WBC # BLD: 14.2 K/UL (ref 4–11)

## 2022-11-25 PROCEDURE — 36415 COLL VENOUS BLD VENIPUNCTURE: CPT

## 2022-11-25 PROCEDURE — 83735 ASSAY OF MAGNESIUM: CPT

## 2022-11-25 PROCEDURE — 1200000000 HC SEMI PRIVATE

## 2022-11-25 PROCEDURE — 6370000000 HC RX 637 (ALT 250 FOR IP): Performed by: INTERNAL MEDICINE

## 2022-11-25 PROCEDURE — 6360000002 HC RX W HCPCS: Performed by: INTERNAL MEDICINE

## 2022-11-25 PROCEDURE — 80069 RENAL FUNCTION PANEL: CPT

## 2022-11-25 PROCEDURE — 2580000003 HC RX 258: Performed by: INTERNAL MEDICINE

## 2022-11-25 PROCEDURE — 85025 COMPLETE CBC W/AUTO DIFF WBC: CPT

## 2022-11-25 RX ORDER — IBUPROFEN 400 MG/1
400 TABLET ORAL EVERY 12 HOURS PRN
Status: DISCONTINUED | OUTPATIENT
Start: 2022-11-25 | End: 2022-11-26 | Stop reason: HOSPADM

## 2022-11-25 RX ADMIN — POTASSIUM BICARBONATE 20 MEQ: 782 TABLET, EFFERVESCENT ORAL at 20:17

## 2022-11-25 RX ADMIN — ENOXAPARIN SODIUM 40 MG: 100 INJECTION SUBCUTANEOUS at 20:17

## 2022-11-25 RX ADMIN — HYDROMORPHONE HYDROCHLORIDE 1 MG: 1 INJECTION, SOLUTION INTRAMUSCULAR; INTRAVENOUS; SUBCUTANEOUS at 05:16

## 2022-11-25 RX ADMIN — POTASSIUM BICARBONATE 20 MEQ: 782 TABLET, EFFERVESCENT ORAL at 12:38

## 2022-11-25 RX ADMIN — OXYCODONE AND ACETAMINOPHEN 2 TABLET: 5; 325 TABLET ORAL at 20:17

## 2022-11-25 RX ADMIN — SODIUM CHLORIDE, PRESERVATIVE FREE 10 ML: 5 INJECTION INTRAVENOUS at 09:43

## 2022-11-25 RX ADMIN — SODIUM CHLORIDE, PRESERVATIVE FREE 10 ML: 5 INJECTION INTRAVENOUS at 20:18

## 2022-11-25 RX ADMIN — OXYCODONE AND ACETAMINOPHEN 2 TABLET: 5; 325 TABLET ORAL at 09:37

## 2022-11-25 RX ADMIN — HYDROMORPHONE HYDROCHLORIDE 1 MG: 1 INJECTION, SOLUTION INTRAMUSCULAR; INTRAVENOUS; SUBCUTANEOUS at 21:31

## 2022-11-25 RX ADMIN — IBUPROFEN 400 MG: 400 TABLET, FILM COATED ORAL at 10:31

## 2022-11-25 ASSESSMENT — PAIN DESCRIPTION - FREQUENCY
FREQUENCY: CONTINUOUS
FREQUENCY: INTERMITTENT

## 2022-11-25 ASSESSMENT — PAIN DESCRIPTION - ORIENTATION
ORIENTATION: LOWER
ORIENTATION: LOWER;MID
ORIENTATION: LOWER
ORIENTATION: LOWER

## 2022-11-25 ASSESSMENT — PAIN DESCRIPTION - LOCATION
LOCATION: ABDOMEN;BACK

## 2022-11-25 ASSESSMENT — PAIN DESCRIPTION - DIRECTION
RADIATING_TOWARDS: ABDOMEN TO BACK
RADIATING_TOWARDS: ABDOMEN TO BACK

## 2022-11-25 ASSESSMENT — PAIN DESCRIPTION - PAIN TYPE
TYPE: ACUTE PAIN

## 2022-11-25 ASSESSMENT — PAIN DESCRIPTION - DESCRIPTORS
DESCRIPTORS: ACHING;DISCOMFORT
DESCRIPTORS: ACHING;DISCOMFORT
DESCRIPTORS: ACHING
DESCRIPTORS: ACHING

## 2022-11-25 ASSESSMENT — PAIN SCALES - GENERAL
PAINLEVEL_OUTOF10: 6
PAINLEVEL_OUTOF10: 7
PAINLEVEL_OUTOF10: 8
PAINLEVEL_OUTOF10: 7
PAINLEVEL_OUTOF10: 3

## 2022-11-25 ASSESSMENT — PAIN - FUNCTIONAL ASSESSMENT
PAIN_FUNCTIONAL_ASSESSMENT: ACTIVITIES ARE NOT PREVENTED
PAIN_FUNCTIONAL_ASSESSMENT: ACTIVITIES ARE NOT PREVENTED
PAIN_FUNCTIONAL_ASSESSMENT: PREVENTS OR INTERFERES SOME ACTIVE ACTIVITIES AND ADLS
PAIN_FUNCTIONAL_ASSESSMENT: ACTIVITIES ARE NOT PREVENTED

## 2022-11-25 NOTE — PROGRESS NOTES
PROGRESS NOTE    HPI: Millie Jane is a(n)62 y.o. male admitted for work-up and treatment for Choledocholithiasis [K80.50]. We are following for pancreatitis. Subjective:     Says pancreatitis like pain is improved. Has low back pain. Feels \"puffy\". Objective:     I/O last 3 completed shifts: In: 100 [P.O.:100]  Out: -       BP (!) 109/48   Pulse 64   Temp 99.4 °F (37.4 °C) (Oral)   Resp 17   Ht 5' 8\" (1.727 m)   Wt 182 lb (82.6 kg)   SpO2 93%   BMI 27.67 kg/m²     Physical Exam:  HEENT: anicteric sclera, oropharyngeal membranes pink and moist.  Cor: RRR  Lungs: non-labored, no respiratory distress  Abdomen: distended, mild tenderness, non tympanic. Extremities: trace - +1 BLE, BUE edema  Neuro: alert and oriented x 3      Results:   Lab Results   Component Value Date     (H) 11/24/2022    AST 29 11/24/2022    ALKPHOS 114 11/24/2022    BILIDIR 0.3 11/24/2022    PROT 6.0 (L) 11/24/2022    LABALBU 3.2 (L) 11/24/2022    LIPASE 176.0 (H) 11/24/2022     Lab Results   Component Value Date    WBC 16.2 (H) 11/24/2022    HGB 10.6 (L) 11/24/2022    HCT 30.8 (L) 11/24/2022    MCV 94.5 11/24/2022     11/24/2022     BUN/Cr/glu/ALT/AST/amyl/lip:  20/0.9/--/117/29/--/176.0 (11/24 0617)  MRI ABDOMEN WO CONTRAST MRCP    Result Date: 11/23/2022  Acute pancreatitis. Cholelithiasis without evidence for acute cholecystitis. No biliary ductal dilatation or choledocholithiasis. RECOMMENDATIONS: Nonemergent contrast-enhanced liver CT or MRI to further evaluate the segment 3 liver lesion. Impression:  1. Acute pancreatitis, suspect biliary cause with passed CBD stone as LFTs improved, MRCP not suggestive of biliary obstruction. No frequent alcohol use or new medications aside from Cipro 2 weeks ago. TCGs 158.     2. Elevated LFTs secondary to biliary obstruction. Unlikely DILI from Cipro. Has CCY scheduled on 12/5 reportedly.     3. Fluid overload. 4. Leukocytosis. Likely secondary to inflammation. Plan:  IVF turned off. Recommend continuing low rate if symptom return/ unable to tolerate clears. Clear liquid diet today   Antiemetics ordered. Monitor BMP, CBC.       Antwon Hagen MD       (O) [phone]

## 2022-11-25 NOTE — CARE COORDINATION
Per information at [address], pt disposition is pending pain control and advancing of diet. Pt from home and IPTA. Likely NN from CM team. Please consult CM if needs arise.      Marilee Hi RN

## 2022-11-25 NOTE — PLAN OF CARE
Problem: Pain  Goal: Verbalizes/displays adequate comfort level or baseline comfort level  11/25/2022 1255 by Lizeth Stanton RN  Outcome: Progressing  Pt scoring pain on 0-10 scale. Pain medications given per MAR. Pt instructed to call out when pain level increasing. Call light within reach. Nurse will continue to reassess and monitor.

## 2022-11-25 NOTE — PROGRESS NOTES
Hospitalist Progress Note      PCP: No primary care provider on file. Date of Admission: 11/22/2022    Chief Complaint: epigastric abdominal pain, passing out     Hospital Course: reviewed     Subjective: no overnight events, feels much better       Medications:  Reviewed    Infusion Medications    sodium chloride       Scheduled Medications    sodium chloride flush  5-40 mL IntraVENous 2 times per day    enoxaparin  40 mg SubCUTAneous Q24H     PRN Meds: ibuprofen, sodium chloride flush, sodium chloride, ondansetron **OR** ondansetron, polyethylene glycol, acetaminophen **OR** acetaminophen, oxyCODONE-acetaminophen **OR** oxyCODONE-acetaminophen, hydrALAZINE, HYDROmorphone    No intake or output data in the 24 hours ending 11/25/22 0738    Physical Exam Performed:    BP (!) 109/48   Pulse 64   Temp 99.4 °F (37.4 °C) (Oral)   Resp 17   Ht 5' 8\" (1.727 m)   Wt 182 lb (82.6 kg)   SpO2 93%   BMI 27.67 kg/m²     General appearance: No apparent distress, appears stated age and cooperative. HEENT: Pupils equal, round, and reactive to light. Conjunctivae/corneas clear. Neck: Supple, with full range of motion. No jugular venous distention. Trachea midline. Respiratory:  Normal respiratory effort. Clear to auscultation, bilaterally without Rales/Wheezes/Rhonchi. Cardiovascular: Regular rate and rhythm with normal S1/S2 without murmurs, rubs or gallops. Abdomen: Soft, non-tender, non-distended with normal bowel sounds. Musculoskeletal: No clubbing, cyanosis or edema bilaterally. Full range of motion without deformity. Skin: Skin color, texture, turgor normal.  No rashes or lesions. Neurologic:  Neurovascularly intact without any focal sensory/motor deficits.  Cranial nerves: II-XII intact, grossly non-focal.  Psychiatric: Alert and oriented, thought content appropriate, normal insight  Capillary Refill: Brisk, 3 seconds, normal   Peripheral Pulses: +2 palpable, equal bilaterally       Labs:   Recent Labs     11/23/22  0856 11/24/22  0617   WBC 14.4* 16.2*   HGB 11.8* 10.6*   HCT 34.8* 30.8*    208     Recent Labs     11/23/22  0856 11/23/22  0857 11/24/22  0617    140 137   K 3.7 3.7 3.5    103 102   CO2 28 27 25   BUN 22* 22* 20   CREATININE 0.9 0.9 0.9   CALCIUM 8.2* 8.1* 8.2*   PHOS 2.2*  --   --      Recent Labs     11/23/22  0856 11/24/22  0617   AST 59* 29   * 117*   BILIDIR <0.2 0.3   BILITOT 0.6 0.8   ALKPHOS 130* 114     No results for input(s): INR in the last 72 hours. No results for input(s): Cleatrice Wells in the last 72 hours. Urinalysis:    No results found for: Ferdinand Lockhart, BACTERIA, RBCUA, BLOODU, Queta Yin, Khurram [place] [address]    Radiology:  MRI ABDOMEN WO CONTRAST MRCP   Final Result   Acute pancreatitis. Cholelithiasis without evidence for acute cholecystitis. No biliary ductal dilatation or choledocholithiasis. RECOMMENDATIONS:   Nonemergent contrast-enhanced liver CT or MRI to further evaluate the segment   3 liver lesion. IP CONSULT TO GI    Assessment/Plan:    Active Hospital Problems    Diagnosis     Choledocholithiasis [K80.50]      Priority: Medium    Essential hypertension [I10]      Priority: Medium    Epigastric abdominal pain [R10.13]      Priority: Medium       Choledocholithiasis, gallstone pancreatitis - Kept NPO.  - Provided aggressive IV fluids  -consulted GI.   -MRCP done 11/23(acute pancreatitis, noted cholelithiasis without evidence for acute cholecystitis , no biliary ductal dilation or choledocolithiasis)  - Checked lipase     Epigastric abdominal pain - Provided pain medications as needed     Essential (primary) hypertension - provided prn meds and monitored blood pressure           Code Status: Full Code  Diet: ADULT DIET;  Clear Liquid  Code Status: Full Code  PT/OT Eval Status: not needed     Dispo - pending GI recs,  pending clinical improvement and advancing diet, ?by weekend    Appropriate for A1 Discharge Unit: No      Olena Wall MD

## 2022-11-25 NOTE — PLAN OF CARE
Problem: Pain  Goal: Verbalizes/displays adequate comfort level or baseline comfort level  Flowsheets (Taken 11/25/2022 0052)  Verbalizes/displays adequate comfort level or baseline comfort level:   Encourage patient to monitor pain and request assistance   Assess pain using appropriate pain scale   Administer analgesics based on type and severity of pain and evaluate response

## 2022-11-26 VITALS
RESPIRATION RATE: 18 BRPM | DIASTOLIC BLOOD PRESSURE: 63 MMHG | SYSTOLIC BLOOD PRESSURE: 130 MMHG | HEIGHT: 68 IN | TEMPERATURE: 98.8 F | HEART RATE: 58 BPM | WEIGHT: 182.6 LBS | BODY MASS INDEX: 27.68 KG/M2 | OXYGEN SATURATION: 95 %

## 2022-11-26 LAB
ALBUMIN SERPL-MCNC: 3.3 G/DL (ref 3.4–5)
ANION GAP SERPL CALCULATED.3IONS-SCNC: 13 MMOL/L (ref 3–16)
BASOPHILS ABSOLUTE: 0 K/UL (ref 0–0.2)
BASOPHILS RELATIVE PERCENT: 0.3 %
BUN BLDV-MCNC: 20 MG/DL (ref 7–20)
CALCIUM SERPL-MCNC: 8.7 MG/DL (ref 8.3–10.6)
CHLORIDE BLD-SCNC: 100 MMOL/L (ref 99–110)
CO2: 24 MMOL/L (ref 21–32)
CREAT SERPL-MCNC: 0.9 MG/DL (ref 0.8–1.3)
EOSINOPHILS ABSOLUTE: 0.1 K/UL (ref 0–0.6)
EOSINOPHILS RELATIVE PERCENT: 0.7 %
GFR SERPL CREATININE-BSD FRML MDRD: >60 ML/MIN/{1.73_M2}
GLUCOSE BLD-MCNC: 83 MG/DL (ref 70–99)
HCT VFR BLD CALC: 30.3 % (ref 40.5–52.5)
HEMOGLOBIN: 10.6 G/DL (ref 13.5–17.5)
LYMPHOCYTES ABSOLUTE: 1.2 K/UL (ref 1–5.1)
LYMPHOCYTES RELATIVE PERCENT: 8.4 %
MAGNESIUM: 2 MG/DL (ref 1.8–2.4)
MCH RBC QN AUTO: 32.3 PG (ref 26–34)
MCHC RBC AUTO-ENTMCNC: 34.8 G/DL (ref 31–36)
MCV RBC AUTO: 92.8 FL (ref 80–100)
MONOCYTES ABSOLUTE: 1 K/UL (ref 0–1.3)
MONOCYTES RELATIVE PERCENT: 7.5 %
NEUTROPHILS ABSOLUTE: 11.7 K/UL (ref 1.7–7.7)
NEUTROPHILS RELATIVE PERCENT: 83.1 %
PDW BLD-RTO: 12.7 % (ref 12.4–15.4)
PHOSPHORUS: 1.5 MG/DL (ref 2.5–4.9)
PLATELET # BLD: 268 K/UL (ref 135–450)
PMV BLD AUTO: 8.1 FL (ref 5–10.5)
POTASSIUM SERPL-SCNC: 3.1 MMOL/L (ref 3.5–5.1)
RBC # BLD: 3.27 M/UL (ref 4.2–5.9)
SODIUM BLD-SCNC: 137 MMOL/L (ref 136–145)
WBC # BLD: 14 K/UL (ref 4–11)

## 2022-11-26 PROCEDURE — 85025 COMPLETE CBC W/AUTO DIFF WBC: CPT

## 2022-11-26 PROCEDURE — 6370000000 HC RX 637 (ALT 250 FOR IP): Performed by: INTERNAL MEDICINE

## 2022-11-26 PROCEDURE — 6360000002 HC RX W HCPCS: Performed by: INTERNAL MEDICINE

## 2022-11-26 PROCEDURE — 83735 ASSAY OF MAGNESIUM: CPT

## 2022-11-26 PROCEDURE — 2580000003 HC RX 258: Performed by: INTERNAL MEDICINE

## 2022-11-26 PROCEDURE — 80069 RENAL FUNCTION PANEL: CPT

## 2022-11-26 PROCEDURE — 36415 COLL VENOUS BLD VENIPUNCTURE: CPT

## 2022-11-26 RX ORDER — PANTOPRAZOLE SODIUM 40 MG/1
40 TABLET, DELAYED RELEASE ORAL
Qty: 30 TABLET | Refills: 1 | Status: SHIPPED | OUTPATIENT
Start: 2022-11-27

## 2022-11-26 RX ORDER — PANTOPRAZOLE SODIUM 40 MG/1
40 TABLET, DELAYED RELEASE ORAL
Status: DISCONTINUED | OUTPATIENT
Start: 2022-11-26 | End: 2022-11-26 | Stop reason: HOSPADM

## 2022-11-26 RX ADMIN — HYDROMORPHONE HYDROCHLORIDE 1 MG: 1 INJECTION, SOLUTION INTRAMUSCULAR; INTRAVENOUS; SUBCUTANEOUS at 05:37

## 2022-11-26 RX ADMIN — OXYCODONE AND ACETAMINOPHEN 2 TABLET: 5; 325 TABLET ORAL at 08:00

## 2022-11-26 RX ADMIN — SODIUM CHLORIDE, PRESERVATIVE FREE 10 ML: 5 INJECTION INTRAVENOUS at 08:02

## 2022-11-26 RX ADMIN — POTASSIUM BICARBONATE 20 MEQ: 782 TABLET, EFFERVESCENT ORAL at 08:01

## 2022-11-26 RX ADMIN — PANTOPRAZOLE SODIUM 40 MG: 40 TABLET, DELAYED RELEASE ORAL at 06:39

## 2022-11-26 RX ADMIN — OXYCODONE AND ACETAMINOPHEN 1 TABLET: 5; 325 TABLET ORAL at 01:49

## 2022-11-26 ASSESSMENT — PAIN SCALES - WONG BAKER
WONGBAKER_NUMERICALRESPONSE: 0

## 2022-11-26 ASSESSMENT — PAIN DESCRIPTION - PAIN TYPE
TYPE: ACUTE PAIN
TYPE: ACUTE PAIN

## 2022-11-26 ASSESSMENT — PAIN DESCRIPTION - FREQUENCY
FREQUENCY: INTERMITTENT
FREQUENCY: INTERMITTENT

## 2022-11-26 ASSESSMENT — PAIN - FUNCTIONAL ASSESSMENT
PAIN_FUNCTIONAL_ASSESSMENT: ACTIVITIES ARE NOT PREVENTED

## 2022-11-26 ASSESSMENT — PAIN SCALES - GENERAL
PAINLEVEL_OUTOF10: 3
PAINLEVEL_OUTOF10: 2
PAINLEVEL_OUTOF10: 8
PAINLEVEL_OUTOF10: 2

## 2022-11-26 ASSESSMENT — PAIN DESCRIPTION - ORIENTATION
ORIENTATION: LOWER

## 2022-11-26 ASSESSMENT — PAIN DESCRIPTION - DIRECTION: RADIATING_TOWARDS: ABDOMEN TO BACK

## 2022-11-26 ASSESSMENT — PAIN DESCRIPTION - DESCRIPTORS
DESCRIPTORS: ACHING;DISCOMFORT

## 2022-11-26 ASSESSMENT — PAIN DESCRIPTION - LOCATION
LOCATION: ABDOMEN;BACK
LOCATION: BACK
LOCATION: BACK

## 2022-11-26 NOTE — PROGRESS NOTES
PROGRESS NOTE    HPI: Jarrell Tatum is a(n)62 y.o. male admitted for work-up and treatment for Choledocholithiasis [K80.50]. We are following for pancreatitis. Subjective:     Says pancreatitis like pain is improved. Has low back pain. Feels \"puffy\". Objective:     I/O last 3 completed shifts: In: 221 [P.O.:221]  Out: -       /64   Pulse 67   Temp 99.1 °F (37.3 °C) (Oral)   Resp (!) 0   Ht 5' 8\" (1.727 m)   Wt 182 lb 9.6 oz (82.8 kg)   SpO2 93%   BMI 27.76 kg/m²     Physical Exam:  HEENT: anicteric sclera, oropharyngeal membranes pink and moist.  Cor: RRR  Lungs: non-labored, no respiratory distress  Abdomen: distended, mild tenderness, non tympanic. Extremities: trace - +1 BLE, BUE edema  Neuro: alert and oriented x 3      Results:   Lab Results   Component Value Date     (H) 11/24/2022    AST 29 11/24/2022    ALKPHOS 114 11/24/2022    BILIDIR 0.3 11/24/2022    PROT 6.0 (L) 11/24/2022    LABALBU 3.1 (L) 11/25/2022    LIPASE 176.0 (H) 11/24/2022     Lab Results   Component Value Date    WBC 14.2 (H) 11/25/2022    HGB 9.9 (L) 11/25/2022    HCT 28.5 (L) 11/25/2022    MCV 92.6 11/25/2022     11/25/2022     BUN/Cr/glu/ALT/AST/amyl/lip:  21/0.9/--/--/--/--/-- (11/25 1119)  MRI ABDOMEN WO CONTRAST MRCP    Result Date: 11/23/2022  Acute pancreatitis. Cholelithiasis without evidence for acute cholecystitis. No biliary ductal dilatation or choledocholithiasis. RECOMMENDATIONS: Nonemergent contrast-enhanced liver CT or MRI to further evaluate the segment 3 liver lesion. Impression:  1. Acute pancreatitis, suspect biliary cause with passed CBD stone as LFTs improved, MRCP not suggestive of biliary obstruction. No frequent alcohol use or new medications aside from Cipro 2 weeks ago. TCGs 158.     2. Elevated LFTs secondary to biliary obstruction. Unlikely DILI from Cipro. Has CCY scheduled on 12/5 reportedly.     3. Fluid overload. 4. Leukocytosis. Likely secondary to inflammation. Plan:  IVF turned off. Tolerating Clear liquid diet. Will advance to low fat, and OK to D/C if tolerated  Will start PO Protonix for GERD Sx.    Will follow      Nurys Valencia MD       O) [phone]

## 2022-11-26 NOTE — PLAN OF CARE
Problem: Pain  Goal: Verbalizes/displays adequate comfort level or baseline comfort level  11/25/2022 2331 by Alva Eisenberg RN  Outcome: Progressing  Flowsheets (Taken 11/25/2022 2331)  Verbalizes/displays adequate comfort level or baseline comfort level:   Encourage patient to monitor pain and request assistance   Assess pain using appropriate pain scale   Administer analgesics based on type and severity of pain and evaluate response  11/25/2022 1255 by Kenia Griffin RN  Outcome: Progressing

## 2022-11-26 NOTE — PLAN OF CARE
Problem: Discharge Planning  Goal: Discharge to home or other facility with appropriate resources  11/26/2022 1407 by Ada Yuen RN  Outcome: Adequate for Discharge  11/26/2022 1226 by Ada Yuen RN  Outcome: Progressing  Flowsheets (Taken 11/26/2022 0900)  Discharge to home or other facility with appropriate resources: Identify barriers to discharge with patient and caregiver     Problem: Pain  Goal: Verbalizes/displays adequate comfort level or baseline comfort level  11/26/2022 1407 by Ada Yuen RN  Outcome: Adequate for Discharge  11/26/2022 1226 by Ada Yuen RN  Outcome: Progressing   Patient and family given discharge instructions. All questions and concerns were addressed. Patient wheeled to car with all patient belongings.

## 2022-11-26 NOTE — DISCHARGE SUMMARY
Hospital Medicine Discharge Summary    Patient ID: Rocío Contreras      Patient's PCP: No primary care provider on file. Admit Date: 11/22/2022     Discharge Date: 11/26/2022      Admitting Provider: Jessica Vazquez MD     Discharge Provider: Chuyita Husain MD     Discharge Diagnoses: Active Hospital Problems    Diagnosis     Choledocholithiasis [K80.50]      Priority: Medium    Essential hypertension [I10]      Priority: Medium    Epigastric abdominal pain [R10.13]      Priority: Medium       The patient was seen and examined on day of discharge and this discharge summary is in conjunction with any daily progress note from day of discharge. Hospital Course:   HISTORY OF PRESENT ILLNESS: Pt is an 58y.o. year-old male with a history of hypertension. He recently returned from a [address] work in [place], [place] (3-weeks ago). When he returned he had a \"stomach bug.\" He was treated wit Cipro and improved. He developed mild to moderate pain in his epigastric area last night. He proceeded to go to bed. He awoke at approximately 3 am and went to the bathroom. He then experienced severe epigastric abdominal pain and passed out. His wife heard him fall and found him on the bathroom floor I severe pain. He describes the pain as severe, gripping and spasming pain. In the ER his work up had findings concerning for Gallstone Pancreatitis with a Total Bili of 1.7, , , Alk Phos 170, WBC pf 16.0 and a Lipase of 04558. A CT of the abdomen and pelvis had finding c/w acute pancreatitis, borderline dilated common bile duct without significant intrahepatic ductal dilatation. No defined ductal stone, pancreatic mass or ampullary mass. Hepatic steatosis. He is being admitted for further evaluation and treatment. Associated signs and symptoms do not include fever or chills, diarrhea, melena, hematochezia, hematemesis, sweats, dark urine or jaundice.          Choledocholithiasis, gallstone pancreatitis - Kept NPO.  - Provided aggressive IV fluids  -consulted GI. PPI ordered on dc for gerd symptoms  -MRCP done 11/23(acute pancreatitis, noted cholelithiasis without evidence for acute cholecystitis , no biliary ductal dilation or choledocolithiasis)  - Checked lipase and improved     Epigastric abdominal pain - Provided pain medications as needed     Essential (primary) hypertension - provided prn meds and monitored blood pressure    Physical Exam Performed:     /63   Pulse 58   Temp 98.8 °F (37.1 °C) (Oral)   Resp 18   Ht 5' 8\" (1.727 m)   Wt 182 lb 9.6 oz (82.8 kg)   SpO2 95%   BMI 27.76 kg/m²       General appearance:  No apparent distress, appears stated age and cooperative. HEENT:  Normal cephalic, atraumatic without obvious deformity. Pupils equal, round, and reactive to light. Extra ocular muscles intact. Conjunctivae/corneas clear. Neck: Supple, with full range of motion. No jugular venous distention. Trachea midline. Respiratory:  Normal respiratory effort. Clear to auscultation, bilaterally without Rales/Wheezes/Rhonchi. Cardiovascular:  Regular rate and rhythm with normal S1/S2 without murmurs, rubs or gallops. Abdomen: Soft, non-tender, non-distended with normal bowel sounds. Musculoskeletal:  No clubbing, cyanosis or edema bilaterally. Full range of motion without deformity. Skin: Skin color, texture, turgor normal.  No rashes or lesions. Neurologic:  Neurovascularly intact without any focal sensory/motor deficits. Cranial nerves: II-XII intact, grossly non-focal.  Psychiatric:  Alert and oriented, thought content appropriate, normal insight  Capillary Refill: Brisk,< 3 seconds   Peripheral Pulses: +2 palpable, equal bilaterally       Labs:  For convenience and continuity at follow-up the following most recent labs are provided:      CBC:    Lab Results   Component Value Date/Time    WBC 14.0 11/26/2022 05:32 AM    HGB 10.6 11/26/2022 05:32 AM    HCT 30.3 11/26/2022 05:32 AM     11/26/2022 05:32 AM       Renal:    Lab Results   Component Value Date/Time     11/26/2022 05:32 AM    K 3.1 11/26/2022 05:32 AM    K 3.7 11/23/2022 08:57 AM     11/26/2022 05:32 AM    CO2 24 11/26/2022 05:32 AM    BUN 20 11/26/2022 05:32 AM    CREATININE 0.9 11/26/2022 05:32 AM    CALCIUM 8.7 11/26/2022 05:32 AM    PHOS 1.5 11/26/2022 05:32 AM         Significant Diagnostic Studies    Radiology:   MRI ABDOMEN WO CONTRAST MRCP   Final Result   Acute pancreatitis. Cholelithiasis without evidence for acute cholecystitis. No biliary ductal dilatation or choledocholithiasis. RECOMMENDATIONS:   Nonemergent contrast-enhanced liver CT or MRI to further evaluate the segment   3 liver lesion. Consults:     IP CONSULT TO GI    Disposition:  home     Condition at Discharge: Stable    Discharge Instructions/Follow-up:  gen surg as outpt in [address] [place] [address]    Code Status:  Full Code     Activity: activity as tolerated    Diet: low fat, low cholesterol diet      Discharge Medications:     Discharge Medication List as of 11/26/2022  2:31 PM             Details   pantoprazole (PROTONIX) 40 MG tablet Take 1 tablet by mouth every morning (before breakfast), Disp-30 tablet, R-1Normal                Details   olmesartan (BENICAR) 40 MG tablet Take 40 mg by mouth dailyHistorical Med      nebivolol (BYSTOLIC) 10 MG tablet Take by mouth dailyHistorical Med             Time Spent on discharge: 34 min in the examination, evaluation, counseling and review of medications and discharge plan. Signed:    Bob Mcdermott MD   11/26/2022      Thank you No primary care provider on file. for the opportunity to be involved in this patient's care. If you have any questions or concerns, please feel free to contact me at [phone].

## 2022-11-26 NOTE — PLAN OF CARE
Problem: Pain  Goal: Verbalizes/displays adequate comfort level or baseline comfort level  11/26/2022 1226 by Francis Huggins RN  Outcome: Progressing  11/25/2022 2331 by Aranza Mathews RN  Outcome: Progressing  Flowsheets (Taken 11/25/2022 2331)  Verbalizes/displays adequate comfort level or baseline comfort level:   Encourage patient to monitor pain and request assistance   Assess pain using appropriate pain scale   Administer analgesics based on type and severity of pain and evaluate response   Pt scoring pain on 0-10 scale. Pain medications given per MAR. Pt instructed to call out when pain level increasing. Call light within reach. Nurse will continue to reassess and monitor.